# Patient Record
Sex: FEMALE | Race: WHITE | NOT HISPANIC OR LATINO | Employment: FULL TIME | URBAN - METROPOLITAN AREA
[De-identification: names, ages, dates, MRNs, and addresses within clinical notes are randomized per-mention and may not be internally consistent; named-entity substitution may affect disease eponyms.]

---

## 2017-08-23 ENCOUNTER — TRANSCRIBE ORDERS (OUTPATIENT)
Dept: MAMMOGRAPHY | Facility: HOSPITAL | Age: 42
End: 2017-08-23

## 2017-08-23 DIAGNOSIS — Z12.31 VISIT FOR SCREENING MAMMOGRAM: Primary | ICD-10-CM

## 2017-09-08 ENCOUNTER — HOSPITAL ENCOUNTER (OUTPATIENT)
Dept: MAMMOGRAPHY | Facility: HOSPITAL | Age: 42
Discharge: HOME | End: 2017-09-08
Attending: OBSTETRICS & GYNECOLOGY | Admitting: OBSTETRICS & GYNECOLOGY

## 2017-09-08 DIAGNOSIS — Z12.31 VISIT FOR SCREENING MAMMOGRAM: ICD-10-CM

## 2017-09-08 PROCEDURE — G0202 SCR MAMMO BI INCL CAD: HCPCS

## 2017-09-08 PROCEDURE — 77067 SCR MAMMO BI INCL CAD: CPT | Performed by: RADIOLOGY

## 2017-09-08 PROCEDURE — 77063 BREAST TOMOSYNTHESIS BI: CPT | Performed by: RADIOLOGY

## 2017-09-08 PROCEDURE — 77063 BREAST TOMOSYNTHESIS BI: CPT

## 2018-07-31 ENCOUNTER — TRANSCRIBE ORDERS (OUTPATIENT)
Dept: ADMINISTRATIVE | Facility: HOSPITAL | Age: 43
End: 2018-07-31

## 2018-07-31 DIAGNOSIS — Z12.31 VISIT FOR SCREENING MAMMOGRAM: Primary | ICD-10-CM

## 2018-09-10 ENCOUNTER — HOSPITAL ENCOUNTER (OUTPATIENT)
Dept: MAMMOGRAPHY | Facility: HOSPITAL | Age: 43
Discharge: HOME | End: 2018-09-10
Attending: OBSTETRICS & GYNECOLOGY | Admitting: OBSTETRICS & GYNECOLOGY

## 2018-09-10 DIAGNOSIS — Z12.31 VISIT FOR SCREENING MAMMOGRAM: ICD-10-CM

## 2018-09-10 PROCEDURE — 77063 BREAST TOMOSYNTHESIS BI: CPT | Performed by: RADIOLOGY

## 2018-09-10 PROCEDURE — 77067 SCR MAMMO BI INCL CAD: CPT | Performed by: RADIOLOGY

## 2018-09-10 PROCEDURE — 77067 SCR MAMMO BI INCL CAD: CPT

## 2018-09-10 PROCEDURE — 77063 BREAST TOMOSYNTHESIS BI: CPT

## 2020-09-23 PROBLEM — E66.9 OBESITY (BMI 30-39.9): Status: ACTIVE | Noted: 2020-09-23

## 2020-09-24 ENCOUNTER — OFFICE VISIT (OUTPATIENT)
Dept: OBSTETRICS AND GYNECOLOGY | Facility: CLINIC | Age: 45
End: 2020-09-24

## 2020-09-24 VITALS
WEIGHT: 186 LBS | BODY MASS INDEX: 29.19 KG/M2 | SYSTOLIC BLOOD PRESSURE: 110 MMHG | HEIGHT: 67 IN | DIASTOLIC BLOOD PRESSURE: 80 MMHG

## 2020-09-24 DIAGNOSIS — Z01.419 WOMEN'S ANNUAL ROUTINE GYNECOLOGICAL EXAMINATION: Primary | ICD-10-CM

## 2020-09-24 DIAGNOSIS — Z30.41 ENCOUNTER FOR SURVEILLANCE OF CONTRACEPTIVE PILLS: ICD-10-CM

## 2020-09-24 DIAGNOSIS — Z12.31 BREAST CANCER SCREENING BY MAMMOGRAM: ICD-10-CM

## 2020-09-24 DIAGNOSIS — E66.3 OVERWEIGHT: ICD-10-CM

## 2020-09-24 PROCEDURE — 99386 PREV VISIT NEW AGE 40-64: CPT | Performed by: OBSTETRICS & GYNECOLOGY

## 2020-09-24 PROCEDURE — 99213 OFFICE O/P EST LOW 20 MIN: CPT | Performed by: OBSTETRICS & GYNECOLOGY

## 2020-09-24 RX ORDER — NORGESTIMATE AND ETHINYL ESTRADIOL 7DAYSX3 28
1 KIT ORAL DAILY
COMMUNITY
End: 2020-09-24 | Stop reason: SDUPTHER

## 2020-09-24 RX ORDER — NORGESTIMATE AND ETHINYL ESTRADIOL 7DAYSX3 28
1 KIT ORAL DAILY
Qty: 28 TABLET | Refills: 12 | Status: SHIPPED | OUTPATIENT
Start: 2020-09-24 | End: 2021-09-19

## 2020-09-24 NOTE — PROGRESS NOTES
GYN Annual Exam     CC - Here for annual exam.        HPI  Chely Silva is a 45 y.o. female, , who presents for annual well woman exam. Patient's last menstrual period was 2020..  Periods are regular every 25-35 days, lasting 6 days. Her most recent period lasted for 14 days.  Dysmenorrhea: yes ranging from mild to severe. The cramping is worse on CD2-3.  Patient reports problems with: ran out of birth control on  and needs a refill, she is wondering when she needs to restart them.  Partner Status: Marital Status: .  New Partners since last visit: no.  Desires STD Screening: no.    Additional OB/GYN History   Current contraception: contraceptive methods: OCP (estrogen/progesterone)  Desires to: continue contraception  Last Pap :   Last Completed Pap Smear       Status Date      PAP SMEAR Done 2019 NORMAL        History of abnormal Pap smear: yes - long time ago  Family history of uterine, colon, breast, or ovarian cancer: no  Performs monthly Self-Breast Exam: yes  Last mammogram:   Last Completed Mammogram       Status Date      MAMMOGRAM Done 9/10/2018 MAMMO SCREENING DIGITAL TOMOSYNTHESIS BILATERAL W CAD     Patient has more history with this topic...         Exercises Regularly: yes  Feelings of Anxiety or Depression: no  Tobacco Usage?: No   OB History        3    Para   3    Term   3            AB        Living           SAB        TAB        Ectopic        Molar        Multiple        Live Births                    Health Maintenance   Topic Date Due   • Annual Gynecologic Pelvic and Breast Exam  1974   • COLONOSCOPY  1974   • ANNUAL PHYSICAL  1977   • TDAP/TD VACCINES (1 - Tdap) 1993   • INFLUENZA VACCINE  2020   • HEPATITIS C SCREENING  2020   • PAP SMEAR  2020   • MAMMOGRAM  2020   • Pneumococcal Vaccine 65+ (1 of 1 - PPSV23) 2039   • Pneumococcal Vaccine 0-64  Aged Out       The additional following  "portions of the patient's history were reviewed and updated as appropriate: allergies, current medications, past family history, past medical history, past social history, past surgical history and problem list.    Review of Systems   Constitutional: Negative.    HENT: Negative.    Eyes: Negative.    Respiratory: Negative.    Cardiovascular: Negative.    Gastrointestinal: Negative.    Endocrine: Negative.    Genitourinary:        Dysmenorrhea   Musculoskeletal: Negative.    Skin: Negative.    Allergic/Immunologic: Negative.    Neurological: Negative.    Hematological: Negative.    Psychiatric/Behavioral: Negative.      All other systems reviewed and are negative.     I have reviewed and agree with the HPI, ROS, and historical information as entered above. Clemencia Angeles RN    Objective   /80   Ht 170.2 cm (67\")   Wt 84.4 kg (186 lb)   LMP 09/17/2020   Breastfeeding No   BMI 29.13 kg/m²     Physical Exam  Vitals signs and nursing note reviewed. Exam conducted with a chaperone present.   Constitutional:       Appearance: She is well-developed.   HENT:      Head: Normocephalic and atraumatic.   Neck:      Musculoskeletal: Normal range of motion. No muscular tenderness.      Thyroid: No thyroid mass or thyromegaly.   Cardiovascular:      Rate and Rhythm: Normal rate and regular rhythm.      Heart sounds: No murmur.   Pulmonary:      Effort: Pulmonary effort is normal. No retractions.      Breath sounds: Normal breath sounds. No wheezing, rhonchi or rales.   Chest:      Chest wall: No mass or tenderness.      Breasts:         Right: Normal. No mass, nipple discharge, skin change or tenderness.         Left: Normal. No mass, nipple discharge, skin change or tenderness.   Abdominal:      General: Bowel sounds are normal.      Palpations: Abdomen is soft. Abdomen is not rigid. There is no mass.      Tenderness: There is no abdominal tenderness. There is no guarding.      Hernia: No hernia is present. There is no " hernia in the left inguinal area or right inguinal area.   Genitourinary:     General: Normal vulva.      Exam position: Lithotomy position.      Pubic Area: No rash.       Labia:         Right: No rash, tenderness or lesion.         Left: No rash, tenderness or lesion.       Urethra: No urethral pain or urethral swelling.      Vagina: Normal. No vaginal discharge or lesions.      Cervix: No cervical motion tenderness, discharge, lesion or cervical bleeding.      Uterus: Normal. Not enlarged, not fixed and not tender.       Adnexa:         Right: No mass, tenderness or fullness.          Left: No mass, tenderness or fullness.        Rectum: No external hemorrhoid.   Neurological:      Mental Status: She is alert and oriented to person, place, and time.   Psychiatric:         Behavior: Behavior normal.            Assessment and Plan    Problem List Items Addressed This Visit     None      Visit Diagnoses     Women's annual routine gynecological examination    -  Primary    Relevant Orders    Mammo Screening Digital Tomosynthesis Bilateral With CAD    Pap IG, Rfx HPV ASCU    Breast cancer screening by mammogram        Relevant Orders    Mammo Screening Digital Tomosynthesis Bilateral With CAD    Pap IG, Rfx HPV ASCU    Encounter for surveillance of contraceptive pills -we discussed the risk factor associated with contraceptive pill use.  The patient is overweight and has an age of 45.  She does not have any other comorbidities.  We discussed risk of clot to legs lungs and stroke.  Patient understands risks and strongly desires to continue.             1. GYN annual well woman exam.   2. OCP's/Vaginal Ring - Discussed side effects of nausea, BTB, headaches, breast tenderness and slight weight gain in the first three cycles.  Understands risks of blood clots, stroke, and theoretical risk of breast cancer.  Denies family history of blood clots.  3. Reviewed risks/benefits of hormonal contraception after age 35, including  possible increased risk of breast cancer, heart disease, blood clots and strokes.  Patient strongly desires to stay on hormonal contraception.  4. Recommended use of Vitamin D replacement and getting adequate calcium in her diet. (1500mg)  5. Reviewed monthly self breast exams.  Instructed to call with lumps, pain, or breast discharge.    6. Ordered Mammogram today  7. Reviewed BMI and weight loss as preventative health measures.   8. Reviewed exercise as a preventative health measures.   9. Reccommended Flu Vaccine in Fall of each year.  10. RTC in 1 year or PRN with problems.    Clemencia Angeles RN  09/24/2020

## 2020-12-18 ENCOUNTER — HOSPITAL ENCOUNTER (OUTPATIENT)
Dept: MAMMOGRAPHY | Facility: HOSPITAL | Age: 45
Discharge: HOME | End: 2020-12-18
Admitting: OBSTETRICS & GYNECOLOGY

## 2020-12-18 DIAGNOSIS — Z01.419 WOMEN'S ANNUAL ROUTINE GYNECOLOGICAL EXAMINATION: ICD-10-CM

## 2020-12-18 DIAGNOSIS — Z12.31 BREAST CANCER SCREENING BY MAMMOGRAM: ICD-10-CM

## 2020-12-18 PROCEDURE — 77067 SCR MAMMO BI INCL CAD: CPT

## 2020-12-18 PROCEDURE — 77063 BREAST TOMOSYNTHESIS BI: CPT | Performed by: RADIOLOGY

## 2020-12-18 PROCEDURE — 77067 SCR MAMMO BI INCL CAD: CPT | Performed by: RADIOLOGY

## 2020-12-18 PROCEDURE — 77063 BREAST TOMOSYNTHESIS BI: CPT

## 2021-09-19 DIAGNOSIS — Z30.41 ENCOUNTER FOR SURVEILLANCE OF CONTRACEPTIVE PILLS: ICD-10-CM

## 2021-09-19 RX ORDER — NORGESTIMATE AND ETHINYL ESTRADIOL 7DAYSX3 28
KIT ORAL
Qty: 28 TABLET | Refills: 0 | Status: SHIPPED | OUTPATIENT
Start: 2021-09-19 | End: 2021-10-04 | Stop reason: SDUPTHER

## 2021-10-04 ENCOUNTER — OFFICE VISIT (OUTPATIENT)
Dept: OBSTETRICS AND GYNECOLOGY | Facility: CLINIC | Age: 46
End: 2021-10-04

## 2021-10-04 VITALS
SYSTOLIC BLOOD PRESSURE: 120 MMHG | HEIGHT: 67 IN | WEIGHT: 185 LBS | DIASTOLIC BLOOD PRESSURE: 80 MMHG | BODY MASS INDEX: 29.03 KG/M2

## 2021-10-04 DIAGNOSIS — Z12.31 BREAST CANCER SCREENING BY MAMMOGRAM: ICD-10-CM

## 2021-10-04 DIAGNOSIS — Z01.419 WOMEN'S ANNUAL ROUTINE GYNECOLOGICAL EXAMINATION: Primary | ICD-10-CM

## 2021-10-04 DIAGNOSIS — Z30.41 ENCOUNTER FOR SURVEILLANCE OF CONTRACEPTIVE PILLS: ICD-10-CM

## 2021-10-04 DIAGNOSIS — E66.9 OBESITY (BMI 30-39.9): ICD-10-CM

## 2021-10-04 PROCEDURE — 99396 PREV VISIT EST AGE 40-64: CPT | Performed by: OBSTETRICS & GYNECOLOGY

## 2021-10-04 RX ORDER — NORGESTIMATE AND ETHINYL ESTRADIOL 7DAYSX3 28
1 KIT ORAL DAILY
Qty: 28 TABLET | Refills: 12 | Status: SHIPPED | OUTPATIENT
Start: 2021-10-04 | End: 2022-10-06 | Stop reason: SDUPTHER

## 2021-10-04 NOTE — PROGRESS NOTES
GYN Annual Exam     CC - Here for annual exam.        [facility]  Chely Silva is a 46 y.o. female, , who presents for annual well woman exam. Patient's last menstrual period was 2021.  Periods are regular every 25-35 days, lasting 5 days. Dysmenorrhea:mild, occurring throughout menses.  Patient reports problems with: none. There were no changes to her medical or surgical history since her last visit. Partner Status: Marital Status: .  New Partners since last visit: no.  Desires STD Screening: no.    Additional OB/GYN History   Current contraception: contraceptive methods: OCP (estrogen/progesterone)  Desires to: continue contraception  Last Pap : 2020- negative  Last Completed Pap Smear     This patient has no relevant Health Maintenance data.        History of abnormal Pap smear: yes - many years ago HPV +  Family history of uterine, colon, breast, or ovarian cancer: no  Performs monthly Self-Breast Exam: yes  Last mammogram: 2020. Done at . Normal    Last Completed Mammogram          Ordered - MAMMOGRAM (Yearly) Ordered on 10/4/2021    2020  Mammo Screening Digital Tomosynthesis Bilateral With CAD    09/10/2018  Mammo Screening Digital Tomosynthesis Bilateral With CAD    2017  Mammo Screening Digital Tomosynthesis Bilateral With CAD    2016  Mammo screening digital tomosynthesis bilateral    2015  MAMMOGRAPHY SCREENING BILATERAL               Exercises Regularly: yes  Feelings of Anxiety or Depression: no  Tobacco Usage?: No   OB History        3    Para   3    Term   3            AB        Living   3       SAB        TAB        Ectopic        Molar        Multiple        Live Births                    Health Maintenance   Topic Date Due   • COLORECTAL CANCER SCREENING  Never done   • ANNUAL PHYSICAL  Never done   • TDAP/TD VACCINES (1 - Tdap) Never done   • HEPATITIS C SCREENING  Never done   • PAP SMEAR  2021   • Annual Gynecologic  "Pelvic and Breast Exam  09/25/2021   • LIPID PANEL  Never done   • INFLUENZA VACCINE  10/01/2021   • MAMMOGRAM  12/18/2021   • COVID-19 Vaccine  Completed   • Pneumococcal Vaccine 0-64  Aged Out       The additional following portions of the patient's history were reviewed and updated as appropriate: allergies, current medications, past family history, past medical history, past social history, past surgical history and problem list.    Review of Systems   Constitutional: Negative.    HENT: Negative.    Eyes: Negative.    Respiratory: Negative.    Cardiovascular: Negative.    Gastrointestinal: Negative.    Endocrine: Negative.    Genitourinary: Negative.    Musculoskeletal: Negative.    Skin: Negative.    Allergic/Immunologic: Negative.    Neurological: Negative.    Hematological: Negative.    Psychiatric/Behavioral: Negative.          I have reviewed and agree with the HPI, ROS, and historical information as entered above. Gudelia Renee MD    Objective   /80   Ht 170.2 cm (67\")   Wt 83.9 kg (185 lb)   LMP 09/21/2021   BMI 28.98 kg/m²     Physical Exam  Vitals and nursing note reviewed. Exam conducted with a chaperone present.   Constitutional:       Appearance: She is well-developed.   HENT:      Head: Normocephalic and atraumatic.   Neck:      Thyroid: No thyroid mass or thyromegaly.   Cardiovascular:      Rate and Rhythm: Normal rate and regular rhythm.      Heart sounds: No murmur heard.     Pulmonary:      Effort: Pulmonary effort is normal. No retractions.      Breath sounds: Normal breath sounds. No wheezing, rhonchi or rales.   Chest:      Chest wall: No mass or tenderness.      Breasts:         Right: Normal. No mass, nipple discharge, skin change or tenderness.         Left: Normal. No mass, nipple discharge, skin change or tenderness.   Abdominal:      General: Bowel sounds are normal.      Palpations: Abdomen is soft. Abdomen is not rigid. There is no mass.      Tenderness: There is no " abdominal tenderness. There is no guarding.      Hernia: No hernia is present. There is no hernia in the left inguinal area or right inguinal area.   Genitourinary:     General: Normal vulva.      Exam position: Lithotomy position.      Pubic Area: No rash.       Labia:         Right: No rash, tenderness or lesion.         Left: No rash, tenderness or lesion.       Urethra: No urethral pain or urethral swelling.      Vagina: Normal. No vaginal discharge or lesions.      Cervix: No cervical motion tenderness, discharge, lesion or cervical bleeding.      Uterus: Normal. Not enlarged, not fixed and not tender.       Adnexa:         Right: No mass, tenderness or fullness.          Left: No mass, tenderness or fullness.        Rectum: No external hemorrhoid.   Musculoskeletal:      Cervical back: Normal range of motion. No muscular tenderness.   Neurological:      Mental Status: She is alert and oriented to person, place, and time.   Psychiatric:         Behavior: Behavior normal.            Assessment and Plan    Problem List Items Addressed This Visit        Endocrine and Metabolic    Obesity (BMI 30-39.9)    Overview     9/11/2019 BMI of 32.1            Genitourinary and Reproductive     Encounter for surveillance of contraceptive pills    Overview     Reviewed risks and benefits of estrogen-containing birth control pills.  Patient understands risk and strongly desires to continue.         Relevant Medications    norgestimate-ethinyl estradiol (Tri-Sprintec) 0.18/0.215/0.25 MG-35 MCG per tablet      Other Visit Diagnoses     Women's annual routine gynecological examination    -  Primary    Relevant Orders    Pap IG, HPV-hr    Breast cancer screening by mammogram        Relevant Orders    Mammo Screening Digital Tomosynthesis Bilateral With CAD          1. GYN annual well woman exam.   2. Encouraged use of condoms for STD prevention.  3. OCP's/Vaginal Ring - Discussed side effects of nausea, BTB, headaches, breast  tenderness and slight weight gain in the first three cycles.  Understands risks of blood clots, stroke, and theoretical risk of breast cancer.  Denies family history of blood clots.  4. Reviewed risks/benefits of hormonal contraception after age 35, including possible increased risk of breast cancer, heart disease, blood clots and strokes.  Patient strongly desires to stay on hormonal contraception.  5. Reviewed monthly self breast exams.  Instructed to call with lumps, pain, or breast discharge.    6. Ordered Mammogram today  7. Recommended use of Vitamin D replacement and getting adequate calcium in her diet. (1500mg)  8. Reviewed BMI and weight loss as preventative health measures.   9. Reviewed exercise as a preventative health measures.   10. Reccommended Flu Vaccine in Fall of each year.  11. Symptoms of menopausal transition reviewed with patient.   12. RTC in 1 year or PRN with problems.  13. Return in about 1 year (around 10/4/2022) for Annual physical.     Gudelia Renee MD  10/04/2021

## 2021-10-12 DIAGNOSIS — Z01.419 WOMEN'S ANNUAL ROUTINE GYNECOLOGICAL EXAMINATION: ICD-10-CM

## 2021-12-21 ENCOUNTER — HOSPITAL ENCOUNTER (OUTPATIENT)
Dept: MAMMOGRAPHY | Facility: HOSPITAL | Age: 46
Discharge: HOME | End: 2021-12-21
Admitting: OBSTETRICS & GYNECOLOGY

## 2021-12-21 DIAGNOSIS — Z12.31 BREAST CANCER SCREENING BY MAMMOGRAM: ICD-10-CM

## 2021-12-21 PROCEDURE — 77067 SCR MAMMO BI INCL CAD: CPT | Performed by: RADIOLOGY

## 2021-12-21 PROCEDURE — 77063 BREAST TOMOSYNTHESIS BI: CPT | Performed by: RADIOLOGY

## 2021-12-21 PROCEDURE — 77067 SCR MAMMO BI INCL CAD: CPT

## 2021-12-21 PROCEDURE — 77063 BREAST TOMOSYNTHESIS BI: CPT

## 2022-10-06 ENCOUNTER — TELEPHONE (OUTPATIENT)
Dept: OBSTETRICS AND GYNECOLOGY | Facility: CLINIC | Age: 47
End: 2022-10-06

## 2022-10-06 DIAGNOSIS — Z30.41 ENCOUNTER FOR SURVEILLANCE OF CONTRACEPTIVE PILLS: ICD-10-CM

## 2022-10-06 RX ORDER — NORGESTIMATE AND ETHINYL ESTRADIOL 7DAYSX3 28
1 KIT ORAL DAILY
Qty: 28 TABLET | Refills: 3 | Status: SHIPPED | OUTPATIENT
Start: 2022-10-06 | End: 2023-01-23

## 2022-10-10 DIAGNOSIS — Z30.41 ENCOUNTER FOR SURVEILLANCE OF CONTRACEPTIVE PILLS: ICD-10-CM

## 2022-10-10 RX ORDER — NORGESTIMATE AND ETHINYL ESTRADIOL 7DAYSX3 28
KIT ORAL
Qty: 28 TABLET | Refills: 3 | OUTPATIENT
Start: 2022-10-10

## 2022-11-07 ENCOUNTER — TRANSCRIBE ORDERS (OUTPATIENT)
Dept: ADMINISTRATIVE | Facility: HOSPITAL | Age: 47
End: 2022-11-07

## 2022-11-07 DIAGNOSIS — Z12.31 VISIT FOR SCREENING MAMMOGRAM: Primary | ICD-10-CM

## 2022-12-28 ENCOUNTER — HOSPITAL ENCOUNTER (OUTPATIENT)
Dept: MAMMOGRAPHY | Facility: HOSPITAL | Age: 47
Discharge: HOME | End: 2022-12-28
Admitting: OBSTETRICS & GYNECOLOGY

## 2022-12-28 DIAGNOSIS — Z12.31 VISIT FOR SCREENING MAMMOGRAM: ICD-10-CM

## 2022-12-28 PROCEDURE — 77067 SCR MAMMO BI INCL CAD: CPT

## 2022-12-28 PROCEDURE — 77067 SCR MAMMO BI INCL CAD: CPT | Performed by: RADIOLOGY

## 2022-12-28 PROCEDURE — 77063 BREAST TOMOSYNTHESIS BI: CPT

## 2022-12-28 PROCEDURE — 77063 BREAST TOMOSYNTHESIS BI: CPT | Performed by: RADIOLOGY

## 2023-01-12 ENCOUNTER — OFFICE VISIT (OUTPATIENT)
Dept: OBSTETRICS AND GYNECOLOGY | Facility: CLINIC | Age: 48
End: 2023-01-12
Payer: COMMERCIAL

## 2023-01-12 VITALS
SYSTOLIC BLOOD PRESSURE: 122 MMHG | WEIGHT: 193 LBS | BODY MASS INDEX: 30.29 KG/M2 | DIASTOLIC BLOOD PRESSURE: 86 MMHG | HEIGHT: 67 IN

## 2023-01-12 DIAGNOSIS — Z01.419 WOMEN'S ANNUAL ROUTINE GYNECOLOGICAL EXAMINATION: Primary | ICD-10-CM

## 2023-01-12 DIAGNOSIS — Z12.31 BREAST CANCER SCREENING BY MAMMOGRAM: ICD-10-CM

## 2023-01-12 DIAGNOSIS — N93.9 ABNORMAL UTERINE BLEEDING (AUB): ICD-10-CM

## 2023-01-12 PROBLEM — E66.9 OBESITY (BMI 30-39.9): Status: RESOLVED | Noted: 2020-09-23 | Resolved: 2023-01-12

## 2023-01-12 PROCEDURE — 99396 PREV VISIT EST AGE 40-64: CPT | Performed by: OBSTETRICS & GYNECOLOGY

## 2023-01-12 PROCEDURE — 99214 OFFICE O/P EST MOD 30 MIN: CPT | Performed by: OBSTETRICS & GYNECOLOGY

## 2023-01-12 NOTE — PROGRESS NOTES
Gynecologic Annual Exam Note          GYN Annual Exam     Gynecologic Exam        Subjective     HPI  Chely Silva is a 47 y.o. female, , who presents for annual well woman exam as a established patient . Patient's last menstrual period was 2023..  Patient reports problems with: abnormal uterine bleeding despite OCPs.  Her periods occur every 25-35 days , lasting 1-2 weeks or she will have BTB that does not stop.The flow when her periods are longer is light, but heavy when she has shorter periods. States she has saturated a tampon/pad every hour in the past, but not within the last 4 months. She reports dysmenorrhea is worsening over the last couple of year. It is moderate during the first 1-2 days of her period. Partner Status: Marital Status: . She is is sexually active. She has not had new partners.. STD testing recommendations have been explained to the patient and she does not desire STD testing. There were no changes to her medical or surgical history since her last visit..       Additional OB/GYN History   Current contraception: contraceptive methods: OCP (estrogen/progesterone)  Desires to: discuss contraception due to AUB.    Last Pap : 10/4/21. Result: negative. HPV: negative  Last Completed Pap Smear          PAP SMEAR (Every 3 Years) Next due on 10/4/2024    10/04/2021  SCANNED - PAP SMEAR    2020  SCANNED - PAP SMEAR    2019  Done - NORMAL              History of abnormal Pap smear: yes - many years ago  Family history of uterine, colon, breast, or ovarian cancer: no  Performs monthly Self-Breast Exam: yes  Last mammogram: 22. Done at .    Last Completed Mammogram          Ordered - MAMMOGRAM (Yearly) Ordered on 2022  Mammo Screening Digital Tomosynthesis Bilateral With CAD    2021  Mammo Screening Digital Tomosynthesis Bilateral With CAD    2020  Mammo Screening Digital Tomosynthesis Bilateral With CAD    09/10/2018   Mammo Screening Digital Tomosynthesis Bilateral With CAD    2017  Mammo Screening Digital Tomosynthesis Bilateral With CAD    Only the first 5 history entries have been loaded, but more history exists.                Colonoscopy: has never had a colonoscopy.  Exercises Regularly: yes  Feelings of Anxiety or Depression: no  Tobacco Usage?: No       Current Outpatient Medications:   •  norgestimate-ethinyl estradiol (Tri-Sprintec) 0.18/0.215/0.25 MG-35 MCG per tablet, Take 1 tablet by mouth Daily., Disp: 28 tablet, Rfl: 3     Patient is requesting refills of OCPs unless changed.    OB History        3    Para   3    Term   3            AB        Living   3       SAB        IAB        Ectopic        Molar        Multiple        Live Births                    Past Medical History:   Diagnosis Date   • Abnormal Pap smear of cervix    • Acid reflux    • Asthma    • Gastroparesis    • HPV in female    • Hyperlipidemia    • Obesity    • Recurrent pregnancy loss, antepartum condition or complication     2 nisacarriages jn a row in  and    • Screening breast examination     Self; admits        Past Surgical History:   Procedure Laterality Date   • ADENOIDECTOMY     • APPENDECTOMY     • CERVICAL CONE BIOPSY     • DILATATION AND CURETTAGE     • LAPAROSCOPIC CHOLECYSTECTOMY     • TONSILLECTOMY     • WISDOM TOOTH EXTRACTION         Health Maintenance   Topic Date Due   • COLORECTAL CANCER SCREENING  Never done   • COVID-19 Vaccine (1) Never done   • TDAP/TD VACCINES (1 - Tdap) Never done   • HEPATITIS C SCREENING  Never done   • ANNUAL PHYSICAL  Never done   • LIPID PANEL  Never done   • INFLUENZA VACCINE  Never done   • Annual Gynecologic Pelvic and Breast Exam  10/05/2022   • MAMMOGRAM  2023   • PAP SMEAR  10/04/2024   • Pneumococcal Vaccine 0-64  Aged Out       The additional following portions of the patient's history were reviewed and updated as appropriate: allergies, current  "medications, past family history, past medical history, past social history, past surgical history and problem list.    Review of Systems   Constitutional: Negative.    HENT: Negative.    Eyes: Negative.    Respiratory: Negative.    Cardiovascular: Negative.    Gastrointestinal: Negative.    Endocrine: Negative.    Genitourinary: Positive for menstrual problem.   Musculoskeletal: Negative.    Skin: Negative.    Allergic/Immunologic: Negative.    Neurological: Negative.    Hematological: Negative.    Psychiatric/Behavioral: Negative.          I have reviewed and agree with the HPI, ROS, and historical information as entered above. Gudelia Renee MD      Objective   /86   Ht 170.2 cm (67\")   Wt 87.5 kg (193 lb)   LMP 01/05/2023   BMI 30.23 kg/m²     Physical Exam  Vitals and nursing note reviewed. Exam conducted with a chaperone present.   Constitutional:       Appearance: She is well-developed.   HENT:      Head: Normocephalic and atraumatic.   Neck:      Thyroid: No thyroid mass or thyromegaly.   Cardiovascular:      Rate and Rhythm: Normal rate and regular rhythm.      Heart sounds: No murmur heard.  Pulmonary:      Effort: Pulmonary effort is normal. No retractions.      Breath sounds: Normal breath sounds. No wheezing, rhonchi or rales.   Chest:      Chest wall: No mass or tenderness.   Breasts:     Right: Normal. No mass, nipple discharge, skin change or tenderness.      Left: Normal. No mass, nipple discharge, skin change or tenderness.   Abdominal:      General: Bowel sounds are normal.      Palpations: Abdomen is soft. Abdomen is not rigid. There is no mass.      Tenderness: There is no abdominal tenderness. There is no guarding.      Hernia: No hernia is present. There is no hernia in the left inguinal area or right inguinal area.   Genitourinary:     General: Normal vulva.      Exam position: Lithotomy position.      Pubic Area: No rash.       Labia:         Right: No rash, tenderness or " lesion.         Left: No rash, tenderness or lesion.       Urethra: No urethral pain or urethral swelling.      Vagina: Normal. No vaginal discharge or lesions.      Cervix: No cervical motion tenderness, discharge, lesion or cervical bleeding.      Uterus: Normal. Not enlarged, not fixed and not tender.       Adnexa:         Right: No mass, tenderness or fullness.          Left: No mass, tenderness or fullness.        Rectum: No external hemorrhoid.   Musculoskeletal:      Cervical back: Normal range of motion. No muscular tenderness.   Neurological:      Mental Status: She is alert and oriented to person, place, and time.   Psychiatric:         Behavior: Behavior normal.            Assessment and Plan    Problem List Items Addressed This Visit        Genitourinary and Reproductive     Abnormal uterine bleeding (AUB)    Overview     1/12/2023-patient taking Sprintec and has been for years.  In the past year she has had more menstrual irregularity despite being compliant on her timing of OCPs.  She is reporting sometimes her periods will last up to 2 weeks.  She is also just describing breakthrough bleeding.  We will get a CBC and TSH on 1/12/2023.  We will have patient return to assess an ultrasound.         Relevant Orders    TSH    CBC & Differential    US Non-ob Transvaginal   Other Visit Diagnoses     Women's annual routine gynecological examination    -  Primary    Breast cancer screening by mammogram        Relevant Orders    Mammo Screening Digital Tomosynthesis Bilateral With CAD          1. GYN annual well woman exam.   2. Pap guidelines reviewed.  3. OCP's/Vaginal Ring - Discussed side effects of nausea, BTB, headaches, breast tenderness and slight weight gain in the first three cycles.  Understands risks of blood clots, stroke, and theoretical risk of breast cancer.  Denies family history of blood clots.  4. Reviewed risks/benefits of hormonal contraception after age 35, including possible increased risk  of breast cancer, heart disease, blood clots and strokes.  Patient strongly desires to stay on hormonal contraception.  5. Ordered Mammogram today  6. Recommended use of Vitamin D replacement and getting adequate calcium in her diet. (1500mg)  7. Reviewed BMI and weight loss as preventative health measures.   8. Reviewed exercise as a preventative health measures.   9. Reccommended Flu Vaccine in Fall of each year.  10. Symptoms of menopausal transition reviewed with patient.   11. RTC in 1 year or PRN with problems.  12. Return in about 2 weeks (around 1/26/2023) for ultrasound.     Gudelia Renee MD  01/12/2023

## 2023-01-13 LAB
BASOPHILS # BLD AUTO: 0.05 10*3/MM3 (ref 0–0.2)
BASOPHILS NFR BLD AUTO: 0.7 % (ref 0–1.5)
EOSINOPHIL # BLD AUTO: 0.05 10*3/MM3 (ref 0–0.4)
EOSINOPHIL NFR BLD AUTO: 0.7 % (ref 0.3–6.2)
ERYTHROCYTE [DISTWIDTH] IN BLOOD BY AUTOMATED COUNT: 13.1 % (ref 12.3–15.4)
HCT VFR BLD AUTO: 41.7 % (ref 34–46.6)
HGB BLD-MCNC: 13.9 G/DL (ref 12–15.9)
IMM GRANULOCYTES # BLD AUTO: 0.01 10*3/MM3 (ref 0–0.05)
IMM GRANULOCYTES NFR BLD AUTO: 0.1 % (ref 0–0.5)
LYMPHOCYTES # BLD AUTO: 2.21 10*3/MM3 (ref 0.7–3.1)
LYMPHOCYTES NFR BLD AUTO: 32 % (ref 19.6–45.3)
MCH RBC QN AUTO: 29 PG (ref 26.6–33)
MCHC RBC AUTO-ENTMCNC: 33.3 G/DL (ref 31.5–35.7)
MCV RBC AUTO: 86.9 FL (ref 79–97)
MONOCYTES # BLD AUTO: 0.51 10*3/MM3 (ref 0.1–0.9)
MONOCYTES NFR BLD AUTO: 7.4 % (ref 5–12)
NEUTROPHILS # BLD AUTO: 4.08 10*3/MM3 (ref 1.7–7)
NEUTROPHILS NFR BLD AUTO: 59.1 % (ref 42.7–76)
NRBC BLD AUTO-RTO: 0 /100 WBC (ref 0–0.2)
PLATELET # BLD AUTO: 199 10*3/MM3 (ref 140–450)
RBC # BLD AUTO: 4.8 10*6/MM3 (ref 3.77–5.28)
TSH SERPL DL<=0.005 MIU/L-ACNC: 2.35 UIU/ML (ref 0.27–4.2)
WBC # BLD AUTO: 6.91 10*3/MM3 (ref 3.4–10.8)

## 2023-01-23 ENCOUNTER — OFFICE VISIT (OUTPATIENT)
Dept: OBSTETRICS AND GYNECOLOGY | Facility: CLINIC | Age: 48
End: 2023-01-23
Payer: COMMERCIAL

## 2023-01-23 VITALS
DIASTOLIC BLOOD PRESSURE: 70 MMHG | SYSTOLIC BLOOD PRESSURE: 126 MMHG | BODY MASS INDEX: 30.61 KG/M2 | HEIGHT: 67 IN | WEIGHT: 195 LBS

## 2023-01-23 DIAGNOSIS — N93.9 ABNORMAL UTERINE BLEEDING (AUB): Primary | ICD-10-CM

## 2023-01-23 DIAGNOSIS — N83.202 LEFT OVARIAN CYST: ICD-10-CM

## 2023-01-23 DIAGNOSIS — Z76.89 ENCOUNTER FOR BIOPSY: ICD-10-CM

## 2023-01-23 LAB
B-HCG UR QL: NEGATIVE
EXPIRATION DATE: NORMAL
INTERNAL NEGATIVE CONTROL: NORMAL
INTERNAL POSITIVE CONTROL: NORMAL
Lab: NORMAL

## 2023-01-23 PROCEDURE — 99213 OFFICE O/P EST LOW 20 MIN: CPT | Performed by: OBSTETRICS & GYNECOLOGY

## 2023-01-23 PROCEDURE — 81025 URINE PREGNANCY TEST: CPT | Performed by: OBSTETRICS & GYNECOLOGY

## 2023-01-23 RX ORDER — ETONOGESTREL AND ETHINYL ESTRADIOL 11.7; 2.7 MG/1; MG/1
1 INSERT, EXTENDED RELEASE VAGINAL
Qty: 1 EACH | Refills: 12 | Status: SHIPPED | OUTPATIENT
Start: 2023-01-23 | End: 2024-01-23

## 2023-01-23 NOTE — PROGRESS NOTES
Chief Complaint   Patient presents with   • Follow-up     Abnormal Uterine Bleeding         Subjective   HPI  Chely Silva is a 47 y.o. female, , her last LMP was Patient's last menstrual period was 2023..  She presents for a follow up evaluation of Abnormal Uterine Bleeding. The patient was last seen 23 by Gudelia Renee MD. At that time she reported more menstrual irregularity despite being compliant on her timing of OCPs. She is reporting sometimes her periods will last up to 2 weeks. She is also just describing breakthrough bleeding. She had labs drawn. The plan was expectant management and follow up for TVUS. Since the last visit the patient reports the plan has remained unchanged. This has not been an issue in the past. The patient reports additional symptoms as none.      US done today: Yes.  Findings showed Anteverted uterus.  Normal size and shape without evidence of fibroid or polyp.  Endometrial thickness 10.6 mm.  Right ovary within normal limits.  Left ovary with a cyst measuring 35 x 26 x 33 mm that simple in nature.  A follicle is also noted.  No free fluid visualized today..  I have personally evaluated the U/S and agree with the findings. Gudelia Renee MD        Additional OB/GYN History   Last Pap : 10/4/21 Negative, HPV negative  Last Completed Pap Smear          PAP SMEAR (Every 3 Years) Next due on 10/4/2024    10/04/2021  SCANNED - PAP SMEAR    2020  SCANNED - PAP SMEAR    2019  Done - NORMAL              History of abnormal Pap smear: yes - many years ago  Tobacco Usage?: No       Current Outpatient Medications:   •  etonogestrel-ethinyl estradiol (NuvaRing) 0.12-0.015 MG/24HR vaginal ring, Insert 1 each into the vagina Every 28 (Twenty-Eight) Days. Insert vaginally and leave in place for 3 consecutive weeks, then remove for 1 week., Disp: 1 each, Rfl: 12     Past Medical History:   Diagnosis Date   • Abnormal Pap smear of cervix    • Acid  "reflux    • Asthma    • Gastroparesis    • HPV in female    • Hyperlipidemia 1990   • Obesity    • Recurrent pregnancy loss, antepartum condition or complication 2007    2 nisacarriages jn a row in 2007 and 2008   • Screening breast examination     Self; admits        Past Surgical History:   Procedure Laterality Date   • ADENOIDECTOMY     • APPENDECTOMY     • CERVICAL CONE BIOPSY     • DILATATION AND CURETTAGE     • LAPAROSCOPIC CHOLECYSTECTOMY     • TONSILLECTOMY     • WISDOM TOOTH EXTRACTION         The additional following portions of the patient's history were reviewed and updated as appropriate: allergies, current medications, past family history, past medical history, past social history, past surgical history and problem list.    Review of Systems   Constitutional: Negative.    HENT: Negative.    Eyes: Negative.    Respiratory: Negative.    Cardiovascular: Negative.    Gastrointestinal: Negative.    Endocrine: Negative.    Genitourinary: Positive for menstrual problem.   Musculoskeletal: Negative.    Skin: Negative.    Allergic/Immunologic: Negative.    Neurological: Negative.    Hematological: Negative.    Psychiatric/Behavioral: Negative.        I have reviewed and agree with the HPI, ROS, and historical information as entered above. Gudleia Renee MD    Objective   /70   Ht 170.2 cm (67\")   Wt 88.5 kg (195 lb)   LMP 01/05/2023   BMI 30.54 kg/m²     Physical Exam  Vitals and nursing note reviewed. Exam conducted with a chaperone present.   Genitourinary:     General: Normal vulva.      Exam position: Lithotomy position.      Labia:         Right: No rash, tenderness or lesion.         Left: No rash, tenderness or lesion.       Urethra: No urethral pain, urethral swelling or urethral lesion.      Vagina: Normal. No tenderness or lesions.      Cervix: No cervical motion tenderness, discharge, lesion or cervical bleeding.      Uterus: Normal. Not enlarged, not fixed and not tender.       " Adnexa:         Right: No mass, tenderness or fullness.          Left: No mass, tenderness or fullness.        Rectum: No external hemorrhoid.      Comments: Chaperone Present              Endometrial Biopsy      Indications:@ is a 47 y.o. , who presents today for endometrial biopsy.  The patient was noted to have Abnormal Uterine Bleeding.  Her LMP is Patient's last menstrual period was 2023. . After being presented with the risk, benefits, and specific detail of the procedure, the patient wished to proceed.  Written consent was obtained from patient.   Urine pregnancy test was Negative. Patient does not have an allergy to betadine or shellfish.     Procedure Details   The patient was placed on the table in the dorsal lithotomy position.  She was draped in the appropriate manner.  A speculum was placed in the vagina.  The cervix was visualized and prepped with Betadine.  A tenaculum was placed on the anterior lip of the cervix for traction.  A small plastic 5 mm Pipelle syringe curette was inserted into the cervical canal.  The uterus was sounded to 7 cm's.  A vigorous four quadrant biopsy was performed, removing a small amount of tissue.  The tissue was placed in Formalin and sent to Pathology.  The patient tolerated the procedure very well and she reported mild cramping.  The tenaculum was removed from the cervix and the speculum was removed.  The patient was observed for 5 minutes.           Complications: none.     Endometrial Biopsy    Date/Time: 2023 12:30 PM  Performed by: Gudelia Renee MD  Authorized by: Gudelia Rneee MD     Consent:     Consent obtained: verbal and written    Consent given by: patient    Risks discussed: bleeding and infection    Patient agrees, verbalizes understanding, and wants to proceed: yes    Indications:     Indications: abnormal uterine bleeding    Pre-procedure:     Urine pregnancy test: negative    Procedure:     Prepped with: Betadine     Tenaculum used: yes      A local block was performed: no    Findings:     Specimen collected: specimen collected and sent to pathology      Patient tolerance: tolerated well, no immediate complications        Review of Systems   Constitutional: Negative.    HENT: Negative.    Eyes: Negative.    Respiratory: Negative.    Cardiovascular: Negative.    Gastrointestinal: Negative.    Endocrine: Negative.    Genitourinary: Positive for menstrual problem.   Musculoskeletal: Negative.    Skin: Negative.    Allergic/Immunologic: Negative.    Neurological: Negative.    Hematological: Negative.    Psychiatric/Behavioral: Negative.        Plan:  Orders Placed This Encounter   Procedures   • Endometrial Biopsy     This order was created via procedure documentation     Order Specific Question:   Release to patient     Answer:   Routine Release   • US Non-ob Transvaginal     Standing Status:   Future     Standing Expiration Date:   1/23/2024     Order Specific Question:   Reason for Exam:     Answer:   Left ovarian cyst   • POC Pregnancy, Urine     Order Specific Question:   Release to patient     Answer:   Routine Release       Problem List Items Addressed This Visit        Genitourinary and Reproductive     Abnormal uterine bleeding (AUB) - Primary    Overview     1/12/2023-patient taking Sprintec and has been for years.  In the past year she has had more menstrual irregularity despite being compliant on her timing of OCPs.  She is reporting sometimes her periods will last up to 2 weeks.  She is also just describing breakthrough bleeding.  We will get a CBC (hemoglobin 13.9) and TSH (within normal limits) on 1/12/2023.      1/23/2023-ultrasound demonstratedAnteverted uterus.  Normal size and shape without evidence of fibroid or polyp.  Endometrial thickness 10.6 mm.  Right ovary within normal limits.  Left ovary with a cyst measuring 35 x 26 x 33 mm that simple in nature.  A follicle is also noted.  No free fluid visualized  today.  Endometrial biopsy performed this day as well.  Discussed options.  Plan to switch to NuvaRing to see if it helps.  Will reassess when we reassess her left ovarian cyst.         Relevant Medications    etonogestrel-ethinyl estradiol (NuvaRing) 0.12-0.015 MG/24HR vaginal ring    Left ovarian cyst    Overview     1/23/2023-Anteverted uterus.  Normal size and shape without evidence of fibroid or polyp.  Endometrial thickness 10.6 mm.  Right ovary within normal limits.  Left ovary with a cyst measuring 35 x 26 x 33 mm that simple in nature.  A follicle is also noted.  No free fluid visualized today.         Relevant Orders    US Non-ob Transvaginal   Other Visit Diagnoses     Encounter for biopsy        Relevant Orders    POC Pregnancy, Urine (Completed)              Instructions  1. Call the office in 5 business days for biopsy results.  2. Patient instructed to call the office if develops a fever of 100.4 or greater, vaginal bleeding heavier than a period, foul vaginal discharge or pain.     Gudelia Renee MD  01/23/2023      Plan     Follow Up: Return in about 6 weeks (around 3/6/2023) for ultrasound.        Gudelia Renee MD  01/23/2023

## 2023-01-24 LAB — REF LAB TEST METHOD: NORMAL

## 2023-01-31 DIAGNOSIS — Z30.41 ENCOUNTER FOR SURVEILLANCE OF CONTRACEPTIVE PILLS: ICD-10-CM

## 2023-01-31 RX ORDER — NORGESTIMATE AND ETHINYL ESTRADIOL 7DAYSX3 28
KIT ORAL
Qty: 28 TABLET | Refills: 3 | OUTPATIENT
Start: 2023-01-31

## 2023-03-20 ENCOUNTER — OFFICE VISIT (OUTPATIENT)
Dept: OBSTETRICS AND GYNECOLOGY | Facility: CLINIC | Age: 48
End: 2023-03-20
Payer: COMMERCIAL

## 2023-03-20 VITALS
WEIGHT: 192.8 LBS | DIASTOLIC BLOOD PRESSURE: 78 MMHG | HEIGHT: 67 IN | BODY MASS INDEX: 30.26 KG/M2 | SYSTOLIC BLOOD PRESSURE: 124 MMHG

## 2023-03-20 DIAGNOSIS — D25.1 INTRAMURAL LEIOMYOMA OF UTERUS: ICD-10-CM

## 2023-03-20 DIAGNOSIS — N93.9 ABNORMAL UTERINE BLEEDING (AUB): Primary | ICD-10-CM

## 2023-03-20 DIAGNOSIS — N83.202 LEFT OVARIAN CYST: ICD-10-CM

## 2023-03-20 PROBLEM — Z30.41 ENCOUNTER FOR SURVEILLANCE OF CONTRACEPTIVE PILLS: Status: RESOLVED | Noted: 2021-10-04 | Resolved: 2023-03-20

## 2023-03-20 PROCEDURE — 99213 OFFICE O/P EST LOW 20 MIN: CPT | Performed by: OBSTETRICS & GYNECOLOGY

## 2023-03-20 NOTE — PROGRESS NOTES
Chief Complaint   Patient presents with   • Follow-up     Left ovarian cyst and AUB         Subjective   HPI  Chely Silva is a 48 y.o. female, , who presents for follow up evaluation of ovarian cyst.     Her last LMP was Patient's last menstrual period was 2023 (approximate)..  She was last seen on 23 for evaluation of AUB. At that time she reported more menstrual irregularity despite being compliant on her timing of OCPs so plan was for NuvaRing. Since being on NuvaRing, her periods have been regular 25-35 days, lasting 5 days, flow is moderate to heavy, dysmenorrhea is severe premenstrually. She denies BTB. The plan was expectant management and follow up for TVUS.    Pt states since being on NuvaRing she feels more irritable, anxious and feeling low. She states she does not feel like herself. She also complains of moderate to severe headaches every day sometimes relieved with Advil or Excedrin.      US at last visit showed Anteverted uterus.  Normal size and shape without evidence of fibroid or polyp.  Endometrial thickness 10.6 mm.  Right ovary within normal limits.  Left ovary with a cyst measuring 35 x 26 x 33 mm that simple in nature.  A follicle is also noted.  No free fluid visualized at that time.       US done today: Yes.  Findings showed Uterus anteverted with small intramural cyst measuring 10 x 6 x 9 mm.  Ovaries within normal limits.  Left ovarian cyst previously seen and is now resolved..  I have personally evaluated the U/S and agree with the findings.     Additional OB/GYN History   Last Pap :  10/4/21 neg, HPV neg  Last Completed Pap Smear          PAP SMEAR (Every 3 Years) Next due on 10/4/2024    10/04/2021  SCANNED - PAP SMEAR    2020  SCANNED - PAP SMEAR    2019  Done - NORMAL              History of abnormal Pap smear: yes - years ago  Tobacco Usage?: No      Current Outpatient Medications:   •  etonogestrel-ethinyl estradiol (NuvaRing) 0.12-0.015  "MG/24HR vaginal ring, Insert 1 each into the vagina Every 28 (Twenty-Eight) Days. Insert vaginally and leave in place for 3 consecutive weeks, then remove for 1 week., Disp: 1 each, Rfl: 12     Past Medical History:   Diagnosis Date   • Abnormal Pap smear of cervix    • Acid reflux    • Asthma    • Gastroparesis    • HPV in female    • Hyperlipidemia 1990   • Obesity    • Recurrent pregnancy loss, antepartum condition or complication 2007    2 nisacarriages jn a row in 2007 and 2008   • Screening breast examination     Self; admits        Past Surgical History:   Procedure Laterality Date   • ADENOIDECTOMY     • APPENDECTOMY     • CERVICAL CONE BIOPSY     • DILATATION AND CURETTAGE     • LAPAROSCOPIC CHOLECYSTECTOMY     • TONSILLECTOMY     • WISDOM TOOTH EXTRACTION         The additional following portions of the patient's history were reviewed and updated as appropriate: allergies, current medications, past family history, past medical history, past social history, past surgical history and problem list.    Review of Systems   Constitutional: Negative.    HENT: Negative.    Eyes: Negative.    Respiratory: Negative.    Cardiovascular: Negative.    Gastrointestinal: Negative.    Endocrine: Negative.    Genitourinary: Negative.    Musculoskeletal: Negative.    Skin: Negative.    Allergic/Immunologic: Negative.    Neurological: Negative.    Hematological: Negative.    Psychiatric/Behavioral: Negative.      All other systems reviewed and are negative.     I have reviewed and agree with the HPI, ROS, and historical information as entered above. Gudelia Renee MD    /78   Ht 170.2 cm (67\")   Wt 87.5 kg (192 lb 12.8 oz)   LMP 02/27/2023 (Approximate)   BMI 30.20 kg/m²     Physical Exam  Vitals and nursing note reviewed.   Constitutional:       Appearance: Normal appearance. She is well-developed.   HENT:      Head: Normocephalic and atraumatic.   Pulmonary:      Effort: Pulmonary effort is normal.      " Breath sounds: Normal breath sounds.   Abdominal:      General: There is no distension.      Palpations: Abdomen is soft. Abdomen is not rigid. There is no mass.      Tenderness: There is no abdominal tenderness. There is no guarding or rebound.   Musculoskeletal:      Cervical back: Normal range of motion.   Skin:     General: Skin is warm and dry.   Neurological:      Mental Status: She is alert and oriented to person, place, and time.   Psychiatric:         Mood and Affect: Mood normal.         Behavior: Behavior normal.         Assessment & Plan     Assessment and Plan    Problem List Items Addressed This Visit        Genitourinary and Reproductive     Abnormal uterine bleeding (AUB) - Primary    Overview     1/12/2023-patient taking Sprintec and has been for years.  In the past year she has had more menstrual irregularity despite being compliant on her timing of OCPs.  She is reporting sometimes her periods will last up to 2 weeks.  She is also just describing breakthrough bleeding.  We will get a CBC (hemoglobin 13.9) and TSH (within normal limits) on 1/12/2023.      1/23/2023-ultrasound demonstratedAnteverted uterus.  Normal size and shape without evidence of fibroid or polyp.  Endometrial thickness 10.6 mm.  Right ovary within normal limits.  Left ovary with a cyst measuring 35 x 26 x 33 mm that simple in nature.  A follicle is also noted.  No free fluid visualized today.  Endometrial biopsy -benign secretory endometrium..  Discussed options.  Plan to switch to NuvaRing to see if it helps.  Will reassess when we reassess her left ovarian cyst.  3/20/2023-patient is started NuvaRing and abnormal bleeding has resolved.  She does report that she feels a little bit low.  She is unsure if this is related to the winter or to the NuvaRing.  She is agreed to try at least 3 months before making a decision.  If she decides that her mood has not improved, she is to come off the NuvaRing and see if her bleeding is  controlled.         Relevant Medications    etonogestrel-ethinyl estradiol (NuvaRing) 0.12-0.015 MG/24HR vaginal ring    Left ovarian cyst    Overview     1/23/2023-Anteverted uterus.  Normal size and shape without evidence of fibroid or polyp.  Endometrial thickness 10.6 mm.  Right ovary within normal limits.  Left ovary with a cyst measuring 35 x 26 x 33 mm that simple in nature.  A follicle is also noted.  No free fluid visualized today.  3/20/2023-ovarian cyst is now resolved.         Relevant Orders    US Non-ob Transvaginal (Completed)    Intramural leiomyoma of uterus    Overview     3/20/2023-Uterus anteverted with small intramural cyst measuring 10 x 6 x 9 mm.             Lab(s) Ordered  Medication(s) Ordered  Imaging Ordered   Follow Up: Return for Annual physical.      Gudelia Renee MD  03/20/2023

## 2023-03-29 NOTE — TELEPHONE ENCOUNTER
Medication was sent on 10/06/2022 with 3 refills. Should have enough medication until January 2023.    Colchicine Counseling:  Patient counseled regarding adverse effects including but not limited to stomach upset (nausea, vomiting, stomach pain, or diarrhea).  Patient instructed to limit alcohol consumption while taking this medication.  Colchicine may reduce blood counts especially with prolonged use.  The patient understands that monitoring of kidney function and blood counts may be required, especially at baseline. The patient verbalized understanding of the proper use and possible adverse effects of colchicine.  All of the patient's questions and concerns were addressed.

## 2023-08-10 ENCOUNTER — TELEPHONE (OUTPATIENT)
Dept: OBSTETRICS AND GYNECOLOGY | Facility: CLINIC | Age: 48
End: 2023-08-10
Payer: COMMERCIAL

## 2023-08-10 DIAGNOSIS — Z30.41 ENCOUNTER FOR SURVEILLANCE OF CONTRACEPTIVE PILLS: Primary | ICD-10-CM

## 2023-08-10 RX ORDER — NORGESTIMATE AND ETHINYL ESTRADIOL 7DAYSX3 28
1 KIT ORAL DAILY
Qty: 28 TABLET | Refills: 5 | Status: SHIPPED | OUTPATIENT
Start: 2023-08-10 | End: 2024-08-09

## 2023-08-10 NOTE — TELEPHONE ENCOUNTER
Returned patient's call. States she is not liking the Nuvaring and would like to go back on her previous OCPs (TriSprintec). Reports she has been alcantara, gained weight, developed acne, and having severe cramping prior to starting periods. Discussed with Dr. Renee. Misty to switch back to TriSprintec. Rx sent to patient's pharmacy.

## 2023-12-29 ENCOUNTER — HOSPITAL ENCOUNTER (OUTPATIENT)
Dept: MAMMOGRAPHY | Facility: HOSPITAL | Age: 48
Discharge: HOME | End: 2023-12-29
Admitting: OBSTETRICS & GYNECOLOGY
Payer: COMMERCIAL

## 2023-12-29 DIAGNOSIS — Z12.31 BREAST CANCER SCREENING BY MAMMOGRAM: ICD-10-CM

## 2023-12-29 PROCEDURE — 77067 SCR MAMMO BI INCL CAD: CPT

## 2023-12-29 PROCEDURE — 77063 BREAST TOMOSYNTHESIS BI: CPT

## 2024-01-23 DIAGNOSIS — Z30.41 ENCOUNTER FOR SURVEILLANCE OF CONTRACEPTIVE PILLS: ICD-10-CM

## 2024-01-23 RX ORDER — NORGESTIMATE AND ETHINYL ESTRADIOL 7DAYSX3 28
1 KIT ORAL DAILY
Qty: 28 TABLET | Refills: 5 | Status: SHIPPED | OUTPATIENT
Start: 2024-01-23 | End: 2024-01-25 | Stop reason: SDUPTHER

## 2024-01-25 ENCOUNTER — OFFICE VISIT (OUTPATIENT)
Dept: OBSTETRICS AND GYNECOLOGY | Facility: CLINIC | Age: 49
End: 2024-01-25
Payer: COMMERCIAL

## 2024-01-25 VITALS
BODY MASS INDEX: 30.95 KG/M2 | DIASTOLIC BLOOD PRESSURE: 64 MMHG | HEIGHT: 67 IN | SYSTOLIC BLOOD PRESSURE: 112 MMHG | WEIGHT: 197.2 LBS

## 2024-01-25 DIAGNOSIS — Z12.11 SCREEN FOR COLON CANCER: ICD-10-CM

## 2024-01-25 DIAGNOSIS — Z12.31 BREAST CANCER SCREENING BY MAMMOGRAM: ICD-10-CM

## 2024-01-25 DIAGNOSIS — Z30.41 ENCOUNTER FOR SURVEILLANCE OF CONTRACEPTIVE PILLS: ICD-10-CM

## 2024-01-25 DIAGNOSIS — Z01.419 WOMEN'S ANNUAL ROUTINE GYNECOLOGICAL EXAMINATION: Primary | ICD-10-CM

## 2024-01-25 RX ORDER — NORGESTIMATE AND ETHINYL ESTRADIOL 7DAYSX3 28
1 KIT ORAL DAILY
Qty: 28 TABLET | Refills: 12 | Status: SHIPPED | OUTPATIENT
Start: 2024-01-25

## 2024-01-25 NOTE — PROGRESS NOTES
Gynecologic Annual Exam Note          GYN Annual Exam     Gynecologic Exam        Subjective     HPI  Chely Silva is a 48 y.o. female, , who presents for annual well woman exam as a established patient . Patient's last menstrual period was 2024 (approximate)..   Her periods occur every 25-35 days , lasting 6 days. The flow is sometimes heavy(changing ultra tampon every 2 hrous) to light.. She reports dysmenorrhea is none. Partner Status: Marital Status: . She is is sexually active. She has not had new partners.. STD testing recommendations have been explained to the patient and she does not desire STD testing. There were no changes to her medical or surgical history since her last visit..     Pt was last seen on 3/20/2023 for L ovarian cyst and AUB. She had a EMB performed at that time that was negative.     Pt states she does have some BTB every 2-3 months with pills which lasts for several days until her menstrual cycle starts. Pt states BTB is not heavy. Pt states she did NuvaRing for 6-7 months but stopped due to moodiness, cramping, gaining weight and went back on pills. Pt currently is satisfied with the OCP for now.       Additional OB/GYN History   Current contraception: contraceptive methods: OCP (estrogen/progesterone)  Desires to: continue contraception  History of migraines: no    Last Pap : 10/4/2021. Result: negative. HPV: negative.   Last Completed Pap Smear            PAP SMEAR (Every 3 Years) Next due on 10/4/2024      10/04/2021  SCANNED - PAP SMEAR    2020  SCANNED - PAP SMEAR    2019  Done - NORMAL                  History of abnormal Pap smear: yes - long time, since then been normal  Family history of uterine, colon, breast, or ovarian cancer: no  Performs monthly Self-Breast Exam: yes  Last mammogram: 2023. Done at .    Last Completed Mammogram            Ordered - MAMMOGRAM (Yearly) Ordered on 2023  Mammo Screening  Digital Tomosynthesis Bilateral With CAD    2022  Mammo Screening Digital Tomosynthesis Bilateral With CAD    2021  Mammo Screening Digital Tomosynthesis Bilateral With CAD    2020  Mammo Screening Digital Tomosynthesis Bilateral With CAD    09/10/2018  Mammo Screening Digital Tomosynthesis Bilateral With CAD    Only the first 5 history entries have been loaded, but more history exists.                    History of abnormal mammogram: no    Colonoscopy: has never had a colonoscopy.  Exercises Regularly: yes  Feelings of Anxiety or Depression: no  Tobacco Usage?: No       Current Outpatient Medications:     norgestimate-ethinyl estradiol (Tri-Sprintec) 0.18/0.215/0.25 MG-35 MCG per tablet, Take 1 tablet by mouth Daily., Disp: 28 tablet, Rfl: 12     Patient is requesting refills of birth control.    OB History          3    Para   3    Term   3            AB        Living   3         SAB        IAB        Ectopic        Molar        Multiple        Live Births   3                Past Medical History:   Diagnosis Date    Abnormal Pap smear of cervix     Acid reflux     Asthma     Gastroparesis     HPV in female     Hyperlipidemia     Obesity     Recurrent pregnancy loss, antepartum condition or complication     2 nisacarriages jn a row in  and     Screening breast examination     Self; admits        Past Surgical History:   Procedure Laterality Date    ADENOIDECTOMY      APPENDECTOMY      CERVICAL CONE BIOPSY      DILATATION AND CURETTAGE      LAPAROSCOPIC CHOLECYSTECTOMY      TONSILLECTOMY      WISDOM TOOTH EXTRACTION         Health Maintenance   Topic Date Due    BMI FOLLOWUP  Never done    COLORECTAL CANCER SCREENING  Never done    TDAP/TD VACCINES (1 - Tdap) Never done    HEPATITIS C SCREENING  Never done    ANNUAL PHYSICAL  Never done    LIPID PANEL  Never done    INFLUENZA VACCINE  Never done    COVID-19 Vaccine ( season) 2023    Annual Gynecologic  "Pelvic and Breast Exam  01/13/2024    PAP SMEAR  10/04/2024    MAMMOGRAM  12/29/2024    Pneumococcal Vaccine 0-64  Aged Out       The additional following portions of the patient's history were reviewed and updated as appropriate: allergies, current medications, past family history, past medical history, past social history, past surgical history, and problem list.    Review of Systems   Constitutional: Negative.    HENT: Negative.     Eyes: Negative.    Respiratory: Negative.     Cardiovascular: Negative.    Gastrointestinal: Negative.    Endocrine: Negative.    Genitourinary:  Positive for vaginal bleeding.   Musculoskeletal: Negative.    Skin: Negative.    Allergic/Immunologic: Negative.    Neurological: Negative.    Hematological: Negative.    Psychiatric/Behavioral: Negative.           I have reviewed and agree with the HPI, ROS, and historical information as entered above. Gudelia Renee MD          Objective   /64   Ht 170.2 cm (67\")   Wt 89.4 kg (197 lb 3.2 oz)   LMP 01/01/2024 (Approximate)   BMI 30.89 kg/m²     Physical Exam  Vitals and nursing note reviewed. Exam conducted with a chaperone present.   Constitutional:       Appearance: She is well-developed.   HENT:      Head: Normocephalic and atraumatic.   Neck:      Thyroid: No thyroid mass or thyromegaly.   Cardiovascular:      Rate and Rhythm: Normal rate and regular rhythm.      Heart sounds: No murmur heard.  Pulmonary:      Effort: Pulmonary effort is normal. No retractions.      Breath sounds: Normal breath sounds. No wheezing, rhonchi or rales.   Chest:      Chest wall: No mass or tenderness.   Breasts:     Right: Normal. No mass, nipple discharge, skin change or tenderness.      Left: Normal. No mass, nipple discharge, skin change or tenderness.   Abdominal:      General: Bowel sounds are normal.      Palpations: Abdomen is soft. Abdomen is not rigid. There is no mass.      Tenderness: There is no abdominal tenderness. There is " no guarding.      Hernia: No hernia is present. There is no hernia in the left inguinal area or right inguinal area.   Genitourinary:     General: Normal vulva.      Exam position: Lithotomy position.      Pubic Area: No rash.       Labia:         Right: No rash, tenderness or lesion.         Left: No rash, tenderness or lesion.       Urethra: No urethral pain or urethral swelling.      Vagina: Normal. No vaginal discharge or lesions.      Cervix: No cervical motion tenderness, discharge, lesion or cervical bleeding.      Uterus: Normal. Not enlarged, not fixed and not tender.       Adnexa:         Right: No mass, tenderness or fullness.          Left: No mass, tenderness or fullness.        Rectum: No external hemorrhoid.   Musculoskeletal:      Cervical back: Normal range of motion. No muscular tenderness.   Neurological:      Mental Status: She is alert and oriented to person, place, and time.   Psychiatric:         Behavior: Behavior normal.            Assessment and Plan    Problem List Items Addressed This Visit    None  Visit Diagnoses       Women's annual routine gynecological examination    -  Primary    Encounter for surveillance of contraceptive pills        Relevant Medications    norgestimate-ethinyl estradiol (Tri-Sprintec) 0.18/0.215/0.25 MG-35 MCG per tablet    Breast cancer screening by mammogram        Relevant Orders    Mammo Screening Digital Tomosynthesis Bilateral With CAD    Screen for colon cancer        Relevant Orders    Ambulatory Referral For Screening Colonoscopy            GYN annual well woman exam.   Pap guidelines reviewed.  OCP's/Vaginal Ring - Discussed side effects of nausea, BTB, headaches, breast tenderness and slight weight gain in the first three cycles.  Understands risks of blood clots, stroke, and theoretical risk of breast cancer.  Denies family history of blood clots.  Reviewed risks/benefits of hormonal contraception after age 35, including possible increased risk of  breast cancer, heart disease, blood clots and strokes.  Patient strongly desires to stay on hormonal contraception.  Reviewed monthly self breast exams.  Instructed to call with lumps, pain, or breast discharge.    Ordered Mammogram today  Reviewed exercise as a preventative health measures.   Reccommended Flu Vaccine in Fall of each year.  RTC in 1 year or PRN with problems.  Discussed importance of routine colon cancer screening. Reviewed current guidelines. Recommended colonoscopy after age 45.  Return in about 1 year (around 1/25/2025) for Annual physical.     Gudelia Renee MD  01/25/2024

## 2024-12-28 LAB
NCCN CRITERIA FLAG: NORMAL
TYRER CUZICK SCORE: 12.5

## 2024-12-30 ENCOUNTER — HOSPITAL ENCOUNTER (OUTPATIENT)
Dept: MAMMOGRAPHY | Facility: HOSPITAL | Age: 49
Discharge: HOME | End: 2024-12-30
Admitting: OBSTETRICS & GYNECOLOGY
Payer: COMMERCIAL

## 2024-12-30 DIAGNOSIS — Z12.31 BREAST CANCER SCREENING BY MAMMOGRAM: ICD-10-CM

## 2024-12-30 PROCEDURE — 77063 BREAST TOMOSYNTHESIS BI: CPT

## 2024-12-30 PROCEDURE — 77067 SCR MAMMO BI INCL CAD: CPT

## 2024-12-31 PROCEDURE — 77067 SCR MAMMO BI INCL CAD: CPT | Performed by: RADIOLOGY

## 2024-12-31 PROCEDURE — 77063 BREAST TOMOSYNTHESIS BI: CPT | Performed by: RADIOLOGY

## 2025-02-05 ENCOUNTER — OFFICE VISIT (OUTPATIENT)
Dept: OBSTETRICS AND GYNECOLOGY | Facility: CLINIC | Age: 50
End: 2025-02-05
Payer: COMMERCIAL

## 2025-02-05 VITALS
HEIGHT: 67 IN | WEIGHT: 196.2 LBS | DIASTOLIC BLOOD PRESSURE: 74 MMHG | BODY MASS INDEX: 30.79 KG/M2 | SYSTOLIC BLOOD PRESSURE: 110 MMHG

## 2025-02-05 DIAGNOSIS — N95.1 MENOPAUSAL SYMPTOMS: ICD-10-CM

## 2025-02-05 DIAGNOSIS — N92.1 BREAKTHROUGH BLEEDING: ICD-10-CM

## 2025-02-05 DIAGNOSIS — Z01.419 WOMEN'S ANNUAL ROUTINE GYNECOLOGICAL EXAMINATION: Primary | ICD-10-CM

## 2025-02-05 DIAGNOSIS — N39.3 SUI (STRESS URINARY INCONTINENCE, FEMALE): ICD-10-CM

## 2025-02-05 DIAGNOSIS — Z12.31 BREAST CANCER SCREENING BY MAMMOGRAM: ICD-10-CM

## 2025-02-05 RX ORDER — VENLAFAXINE HYDROCHLORIDE 75 MG/1
75 CAPSULE, EXTENDED RELEASE ORAL DAILY
Qty: 30 CAPSULE | Refills: 11 | Status: SHIPPED | OUTPATIENT
Start: 2025-02-05 | End: 2026-02-05

## 2025-02-05 NOTE — PROGRESS NOTES
Gynecologic Annual Exam Note          GYN Annual Exam     Gynecologic Exam        Subjective     HPI  Chely Silva is a 49 y.o. female, , who presents for annual well woman exam as a established patient. There were no changes to her medical or surgical history since her last visit..  Patient's last menstrual period was 2025 (exact date).  Her periods occur every 25-35 days, lasting 4-5 days.  The flow is light to moderate. She reports dysmenorrhea is mild occurring first 1-2 days of flow. Marital Status: . She is sexually active. She has not had new partners.. STD testing recommendations have been explained to the patient and she declines STD testing.    The patient would like to discuss the following complaints today: Pt states she stopped OCPs in 2024. Pt reports she is having BTB still during ovulation, lasting for about 2 days. Pt reports intermittent brain fog, cramping in feet, difficulty losing weight, frequent headaches, joint pain and spontaneous bad breath for about 2 years.  Patient also having stress urinary incontinence.    Additional OB/GYN History   contraceptive methods: Vasectomy   Desires to: do not start contraception  History of migraines: no    Last Pap : 10/4/2021. Result: negative. HPV: negative.   Last Completed Pap Smear       This patient has no relevant Health Maintenance data.          History of abnormal Pap smear: yes - long time ago  Family history of uterine, colon, breast, or ovarian cancer: no  Performs monthly Self-Breast Exam: yes  Last mammogram: 2024. Done at . There is a copy in the chart.    Last Completed Mammogram            Ordered - MAMMOGRAM (Every 2 Years) Ordered on 2024  Mammo Screening Digital Tomosynthesis Bilateral With CAD    2023  Mammo Screening Digital Tomosynthesis Bilateral With CAD    2022  Mammo Screening Digital Tomosynthesis Bilateral With CAD    2021  Mammo Screening Digital  Tomosynthesis Bilateral With CAD    2020  Mammo Screening Digital Tomosynthesis Bilateral With CAD    Only the first 5 history entries have been loaded, but more history exists.                    Colonoscopy: has had a colonoscopy 1 year ago  Exercises Regularly: yes  Feelings of Anxiety or Depression: no  Tobacco Usage?: No       Current Outpatient Medications:     venlafaxine XR (Effexor XR) 75 MG 24 hr capsule, Take 1 capsule by mouth Daily., Disp: 30 capsule, Rfl: 11     Patient denies the need for medication refills today.    OB History          3    Para   3    Term   3            AB        Living   3         SAB        IAB        Ectopic        Molar        Multiple        Live Births   3                Past Medical History:   Diagnosis Date    Abnormal Pap smear of cervix     Acid reflux     Asthma     Gastroparesis     HPV in female     Hyperlipidemia     Obesity     Recurrent pregnancy loss, antepartum condition or complication     2 nisacarriages jn a row in  and     Screening breast examination     Self; admits        Past Surgical History:   Procedure Laterality Date    ADENOIDECTOMY      APPENDECTOMY      CERVICAL CONE BIOPSY      DILATATION AND CURETTAGE      LAPAROSCOPIC CHOLECYSTECTOMY      TONSILLECTOMY      WISDOM TOOTH EXTRACTION         Health Maintenance   Topic Date Due    LIPID PANEL  Never done    BMI FOLLOWUP  Never done    COLORECTAL CANCER SCREENING  Never done    TDAP/TD VACCINES (1 - Tdap) Never done    HEPATITIS C SCREENING  Never done    ANNUAL PHYSICAL  Never done    ZOSTER VACCINE (1 of 2) Never done    INFLUENZA VACCINE  Never done    COVID-19 Vaccine ( - - season) 2024    PAP SMEAR  10/04/2024    Annual Gynecologic Pelvic and Breast Exam  2025    MAMMOGRAM  2026    Pneumococcal Vaccine 0-64  Aged Out       The additional following portions of the patient's history were reviewed and updated as appropriate: allergies,  "current medications, past family history, past medical history, past social history, past surgical history, and problem list.    Review of Systems   Constitutional: Negative.    HENT: Negative.     Eyes: Negative.    Respiratory: Negative.     Cardiovascular: Negative.    Gastrointestinal: Negative.    Endocrine: Negative.    Genitourinary: Negative.    Musculoskeletal: Negative.    Skin: Negative.    Allergic/Immunologic: Negative.    Neurological: Negative.    Hematological: Negative.    Psychiatric/Behavioral: Negative.           I have reviewed and agree with the HPI, ROS, and historical information as entered above. Gudelia Renee MD          Objective   /74   Ht 170.2 cm (67\")   Wt 89 kg (196 lb 3.2 oz)   LMP 01/29/2025 (Exact Date)   BMI 30.73 kg/m²     Physical Exam  Vitals and nursing note reviewed. Exam conducted with a chaperone present.   Constitutional:       Appearance: She is well-developed.   HENT:      Head: Normocephalic and atraumatic.   Neck:      Thyroid: No thyroid mass or thyromegaly.   Cardiovascular:      Rate and Rhythm: Normal rate and regular rhythm.      Heart sounds: No murmur heard.  Pulmonary:      Effort: Pulmonary effort is normal. No retractions.      Breath sounds: Normal breath sounds. No wheezing, rhonchi or rales.   Chest:      Chest wall: No mass or tenderness.   Breasts:     Right: Normal. No mass, nipple discharge, skin change or tenderness.      Left: Normal. No mass, nipple discharge, skin change or tenderness.   Abdominal:      General: Bowel sounds are normal.      Palpations: Abdomen is soft. Abdomen is not rigid. There is no mass.      Tenderness: There is no abdominal tenderness. There is no guarding.      Hernia: No hernia is present. There is no hernia in the left inguinal area or right inguinal area.   Genitourinary:     General: Normal vulva.      Exam position: Lithotomy position.      Pubic Area: No rash.       Labia:         Right: No rash, " tenderness or lesion.         Left: No rash, tenderness or lesion.       Urethra: No urethral pain or urethral swelling.      Vagina: Normal. No vaginal discharge or lesions.      Cervix: No cervical motion tenderness, discharge, lesion or cervical bleeding.      Uterus: Normal. Not enlarged, not fixed and not tender.       Adnexa:         Right: No mass, tenderness or fullness.          Left: No mass, tenderness or fullness.        Rectum: No external hemorrhoid.   Musculoskeletal:      Cervical back: Normal range of motion. No muscular tenderness.   Neurological:      Mental Status: She is alert and oriented to person, place, and time.   Psychiatric:         Behavior: Behavior normal.            Assessment and Plan    Problem List Items Addressed This Visit          Genitourinary and Reproductive     Menopausal symptoms    Overview     2/5/2025-patient reporting menopausal transition symptoms.  She is struggling with sleep and brain fog.  She reports being on the birth control pills did not seem to help the symptoms.  We discussed other options besides hormones including black cohosh and Effexor.  Patient would like to try the Effexor for now.  Will reassess in 6 to 8 weeks.         Relevant Medications    venlafaxine XR (Effexor XR) 75 MG 24 hr capsule     Other Visit Diagnoses       Women's annual routine gynecological examination    -  Primary    Relevant Orders    LIQUID-BASED PAP SMEAR WITH HPV GENOTYPING REGARDLESS OF INTERPRETATION ([facility])    Breast cancer screening by mammogram        Relevant Orders    Mammo Screening Digital Tomosynthesis Bilateral With CAD    Breakthrough bleeding        Relevant Orders    US Non-ob Transvaginal    CLINT (stress urinary incontinence, female)        Relevant Orders    Ambulatory Referral to Physical Therapy for Evaluation & Treatment (Completed)            GYN annual well woman exam.   Pap guidelines reviewed.  Reviewed monthly self breast exams.  Instructed to call  with lumps, pain, or breast discharge.    Ordered Mammogram today  Recommended use of Vitamin D replacement and getting adequate calcium in her diet. (1500mg)  Reviewed BMI and weight loss as preventative health measures.   Reviewed exercise as a preventative health measures.   Reccommended Flu Vaccine in Fall of each year.  Symptoms of menopausal transition reviewed with patient.   RTC in 1 year or PRN with problems.  Return in about 6 weeks (around 3/19/2025) for ultrasound, endometrial biopsy.     Gudelia Renee MD  02/05/2025

## 2025-02-06 LAB — REF LAB TEST METHOD: NORMAL

## 2025-03-17 ENCOUNTER — PROCEDURE VISIT (OUTPATIENT)
Dept: OBSTETRICS AND GYNECOLOGY | Facility: CLINIC | Age: 50
End: 2025-03-17
Payer: COMMERCIAL

## 2025-03-17 VITALS
HEIGHT: 67 IN | BODY MASS INDEX: 30.7 KG/M2 | WEIGHT: 195.6 LBS | SYSTOLIC BLOOD PRESSURE: 120 MMHG | DIASTOLIC BLOOD PRESSURE: 78 MMHG

## 2025-03-17 DIAGNOSIS — N92.1 BREAKTHROUGH BLEEDING: Primary | ICD-10-CM

## 2025-03-17 DIAGNOSIS — N93.9 ABNORMAL UTERINE BLEEDING (AUB): ICD-10-CM

## 2025-03-17 DIAGNOSIS — N95.1 MENOPAUSAL SYMPTOMS: ICD-10-CM

## 2025-03-17 PROBLEM — D25.1 INTRAMURAL LEIOMYOMA OF UTERUS: Status: RESOLVED | Noted: 2023-03-20 | Resolved: 2025-03-17

## 2025-03-17 PROBLEM — N83.202 LEFT OVARIAN CYST: Status: RESOLVED | Noted: 2023-01-23 | Resolved: 2025-03-17

## 2025-03-17 NOTE — PROGRESS NOTES
Chief Complaint   Patient presents with    Vaginal Bleeding    Endometrial biopsy       CC:  AUB    Subjective   HPI  Chely Silva is a 49 y.o. female, , Patient's last menstrual period was 2025 (approximate)..  She presents for evaluation of breakthrough bleeding. Pt states she stopped OCPs in 2024. Pt reports she is having BTB still during ovulation, lasting for about 2 days. In the past the patient has tried birth control pills/Nuvaring without success. The patient has  been evaluated:  No.  The patient reported at her last visit on 25 that she was having sleep disturbance and brain fog. JNA prescribed Effexor at that time. The patient reports today that she does not want to continue taking Effexor because it has not helped her sleeping, brain fog and has decreased her libido.  She does not see a change in her brain fog while on Effexor and states she is waking up more times per night now than before. She reports she has no libido now that she has been on Effexor.    She is asking about an ablation or if she would be a candidate for this. She states she does not want a period anymore. She reports her SADIE and friend both have had ablations and they don't have periods anymore.    US done today: Yes.  Findings showed   Normal pelvic ultrasound.  Endometrial thickness 7.6 mm.  Small follicle seen on left ovary.  No free fluid noted..  I have personally evaluated the U/S and agree with the findings. Gudelia Renee MD       Additional OB/GYN History   Last Pap : 25  Last Completed Pap Smear            Upcoming       PAP SMEAR (Every 3 Years) Next due on 2025  LIQUID-BASED PAP SMEAR WITH HPV GENOTYPING REGARDLESS OF INTERPRETATION ([facility])    10/04/2021  SCANNED - PAP SMEAR    2020  SCANNED - PAP SMEAR    2019  Done - NORMAL                              Current Outpatient Medications:     venlafaxine XR (Effexor XR) 75 MG 24 hr capsule,  "Take 1 capsule by mouth Daily., Disp: 30 capsule, Rfl: 11     Past Medical History:   Diagnosis Date    Abnormal Pap smear of cervix     Acid reflux     Asthma     Gastroparesis     HPV in female     Hyperlipidemia     Obesity     PMS (premenstrual syndrome)     Recurrent pregnancy loss, antepartum condition or complication     2 nisacarriages jn a row in  and     Screening breast examination     Self; admits    Varicella         Past Surgical History:   Procedure Laterality Date    ADENOIDECTOMY      APPENDECTOMY      CERVICAL CONE BIOPSY      D & C WITH SUCTION      DILATATION AND CURETTAGE      LAPAROSCOPIC CHOLECYSTECTOMY      TONSILLECTOMY      WISDOM TOOTH EXTRACTION         The additional following portions of the patient's history were reviewed and updated as appropriate: allergies, current medications, past family history, past medical history, past social history, past surgical history, and problem list.    Review of Systems   Constitutional: Negative.    HENT: Negative.     Eyes: Negative.    Respiratory: Negative.     Cardiovascular: Negative.    Gastrointestinal: Negative.    Endocrine: Negative.    Genitourinary:  Positive for menstrual problem.   Musculoskeletal: Negative.    Skin: Negative.    Allergic/Immunologic: Negative.    Neurological: Negative.    Hematological: Negative.    Psychiatric/Behavioral: Negative.         I have reviewed and agree with the HPI, ROS, and historical information as entered above. Gudelia Renee MD      Objective   /78   Ht 170.2 cm (67\")   Wt 88.7 kg (195 lb 9.6 oz)   LMP 2025 (Approximate)   BMI 30.64 kg/m²       Assessment & Plan     Assessment     Endometrial Biopsy      Indications:NAME@ is a 49 y.o. , who presented today for Intermenstrual Spotting. During the evaluation, an endometrial biopsy was recommended. Her LMP is Patient's last menstrual period was 2025 (approximate). .    After being presented " with the risk, benefits, and specific detail of the procedure, the patient wished to proceed.  Written consent was obtained from patient.   Urine pregnancy test was Negative. Patient does not have an allergy to betadine or shellfish.     Procedure Details     Time out: immediate members of the procedure team and patient agree to the following: correct patient, correct site, correct procedure to be performed. Gudelia Renee MD      The patient was placed on the table in the dorsal lithotomy position.  She was draped in the appropriate manner.  A speculum was placed in the vagina.  The cervix was visualized and prepped with Betadine.  A tenaculum was placed on the anterior lip of the cervix for traction.  A small plastic 5 mm Pipelle syringe curette was inserted into the cervical canal.  The uterus was sounded to 7 cm's.  A vigorous four quadrant biopsy was performed, removing a medium amount of tissue.  The tissue was placed in Formalin and sent to Pathology.  The patient tolerated the procedure very well and she reported mild cramping.  The tenaculum was removed from the cervix and the speculum was removed.  The patient was observed for 5 minutes.           Complications: none.     Endometrial Biopsy    Date/Time: 3/17/2025 4:52 PM    Performed by: Gudelia Renee MD  Authorized by: Gudelia Renee MD    Consent:     Consent obtained: written    Consent given by: patient    Alternatives discussed: observation    Patient agrees, verbalizes understanding, and wants to proceed: yes    Indications:     Indications: abnormal uterine bleeding    Pre-procedure:     Urine pregnancy test: negative    Procedure:     A bimanual exam was performed: yes      Uterus size: non-gravid    Prepped with: Betadine    Tenaculum used: yes      A local block was performed: no      Cervix dilated: no      Number of passes: 1      Physical Exam  Vitals and nursing note reviewed. Exam conducted with a chaperone present.    Genitourinary:     General: Normal vulva.      Exam position: Lithotomy position.      Labia:         Right: No rash, tenderness or lesion.         Left: No rash, tenderness or lesion.       Urethra: No urethral pain, urethral swelling or urethral lesion.      Vagina: Normal. No tenderness or lesions.      Cervix: No cervical motion tenderness, discharge, lesion or cervical bleeding.      Uterus: Normal. Not enlarged, not fixed and not tender.       Adnexa:         Right: No mass, tenderness or fullness.          Left: No mass, tenderness or fullness.        Rectum: No external hemorrhoid.      Comments: Chaperone Present        Post procedural instructions:  Call the office in 5 business days for biopsy results.  Patient instructed to call the office if develops a fever of 100.4 or greater, vaginal bleeding heavier than a period, foul vaginal discharge or pain.       Problem List Items Addressed This Visit          Genitourinary and Reproductive     Abnormal uterine bleeding (AUB)    Overview   1/12/2023-patient taking Sprintec and has been for years.  In the past year she has had more menstrual irregularity despite being compliant on her timing of OCPs.  She is reporting sometimes her periods will last up to 2 weeks.  She is also just describing breakthrough bleeding.  We will get a CBC (hemoglobin 13.9) and TSH (within normal limits) on 1/12/2023.      1/23/2023-ultrasound demonstratedAnteverted uterus.  Normal size and shape without evidence of fibroid or polyp.  Endometrial thickness 10.6 mm.  Right ovary within normal limits.  Left ovary with a cyst measuring 35 x 26 x 33 mm that simple in nature.  A follicle is also noted.  No free fluid visualized today.  Endometrial biopsy -benign secretory endometrium..  Discussed options.  Plan to switch to NuvaRing to see if it helps.  Will reassess when we reassess her left ovarian cyst.  3/20/2023-patient is started NuvaRing and abnormal bleeding has resolved.  She  does report that she feels a little bit low.  She is unsure if this is related to the winter or to the NuvaRing.  She is agreed to try at least 3 months before making a decision.  If she decides that her mood has not improved, she is to come off the NuvaRing and see if her bleeding is controlled.    8/10/23- patient called requesting to change back to TriSprintec as she was having mood changes, acne, and worsening cramping with the NuvaRing.   1/25/2024-happy with her Sprintec at this time.  Occasionally has breakthrough bleeding a few days leading up to her period.    3/17/2025-patient came off her OCPs in December 2024.  Since that time she has had regular cycles with a few days of breakthrough bleeding in between.  On 3/17/2025 she had an ultrasound that was within normal limits and an endometrial biopsy was obtained.  She reports normal lab values from her PCP in the fall and does not wish to repeat at this time.         Relevant Orders    POC Pregnancy, Urine (Completed)    Menopausal symptoms    Overview   2/5/2025-patient reporting menopausal transition symptoms.  She is struggling with sleep and brain fog.  She reports being on the birth control pills did not seem to help the symptoms.  We discussed other options besides hormones including black cohosh and Effexor.  Patient would like to try the Effexor for now.  Will reassess in 6 to 8 weeks.  3/17/2025-patient not happy with the Effexor.  She reports decreased libido and increase in nocturia.  We will come off for now.  Patient discussed that her hemoglobin A1c was elevated at her last PCP visit in the fall.  We discussed how that can also increase nocturia and brain fog.  She is going to look towards a protein rich low-carb diet.  She will reach out if it is not improved.         Relevant Medications    venlafaxine XR (Effexor XR) 75 MG 24 hr capsule     Other Visit Diagnoses         Breakthrough bleeding    -  Primary              Plan     Call for heavy  bleeding  Lab(s) Ordered  Medication(s) Ordered  Discussed options both medical and surgical.  Discussed potential etiologies and treatment options.   Return if symptoms worsen or fail to improve, for Annual physical.        Gudelia Renee MD  03/17/2025

## 2025-03-18 LAB — REF LAB TEST METHOD: NORMAL

## 2025-07-29 ENCOUNTER — TELEPHONE (OUTPATIENT)
Dept: OBSTETRICS AND GYNECOLOGY | Facility: CLINIC | Age: 50
End: 2025-07-29
Payer: COMMERCIAL

## 2025-07-29 NOTE — TELEPHONE ENCOUNTER
Patient of Dr. Renee; LOV 03/17/25.   Returned patient's call.   Appointment scheduled to discuss hormone supplement to stop periods. Patient v/u and agreed.

## 2025-07-29 NOTE — TELEPHONE ENCOUNTER
Pt called in requesting to have some type of hormone supplement, she does not want to have a period any longer. Pt last seen 3/2025.

## 2025-08-13 ENCOUNTER — OFFICE VISIT (OUTPATIENT)
Dept: OBSTETRICS AND GYNECOLOGY | Facility: CLINIC | Age: 50
End: 2025-08-13
Payer: COMMERCIAL

## 2025-08-13 VITALS
SYSTOLIC BLOOD PRESSURE: 112 MMHG | BODY MASS INDEX: 31.61 KG/M2 | HEIGHT: 67 IN | DIASTOLIC BLOOD PRESSURE: 72 MMHG | WEIGHT: 201.4 LBS

## 2025-08-13 DIAGNOSIS — N95.1 MENOPAUSAL SYMPTOMS: Primary | ICD-10-CM

## 2025-08-13 RX ORDER — NORGESTIMATE AND ETHINYL ESTRADIOL 7DAYSX3 LO
1 KIT ORAL DAILY
Qty: 28 TABLET | Refills: 11 | Status: SHIPPED | OUTPATIENT
Start: 2025-08-13 | End: 2026-08-13